# Patient Record
Sex: FEMALE | URBAN - METROPOLITAN AREA
[De-identification: names, ages, dates, MRNs, and addresses within clinical notes are randomized per-mention and may not be internally consistent; named-entity substitution may affect disease eponyms.]

---

## 2018-07-18 ENCOUNTER — APPOINTMENT (OUTPATIENT)
Age: 48
Setting detail: DERMATOLOGY
End: 2018-07-18

## 2018-07-18 DIAGNOSIS — D485 NEOPLASM OF UNCERTAIN BEHAVIOR OF SKIN: ICD-10-CM

## 2018-07-18 PROBLEM — D48.5 NEOPLASM OF UNCERTAIN BEHAVIOR OF SKIN: Status: ACTIVE | Noted: 2018-07-18

## 2018-07-18 PROCEDURE — 11100: CPT

## 2018-07-18 PROCEDURE — ? BIOPSY BY SHAVE METHOD

## 2018-07-18 PROCEDURE — ? COUNSELING

## 2018-07-18 ASSESSMENT — LOCATION SIMPLE DESCRIPTION DERM: LOCATION SIMPLE: CHEST

## 2018-07-18 ASSESSMENT — LOCATION ZONE DERM: LOCATION ZONE: TRUNK

## 2018-07-18 ASSESSMENT — LOCATION DETAILED DESCRIPTION DERM: LOCATION DETAILED: LEFT MEDIAL SUPERIOR CHEST

## 2018-07-18 NOTE — PROCEDURE: BIOPSY BY SHAVE METHOD
Wound Care: Vaseline
Anesthesia Volume In Cc: 0.1
Notification Instructions: Patient will be notified of biopsy results. However, patient instructed to call the office if not contacted within 2 weeks.
Bill 61849 For Specimen Handling/Conveyance To Laboratory?: no
Biopsy Method: Personna blade
Biopsy Type: H and E
Curettage Text: The wound bed was treated with curettage after the biopsy was performed.
Hemostasis: Pressure
Detail Level: Detailed
Size Of Lesion In Cm: 0
Electrodesiccation And Curettage Text: The wound bed was treated with electrodesiccation and curettage after the biopsy was performed.
Type Of Destruction Used: Curettage
Consent: Written consent was obtained and risks were reviewed including but not limited to scarring, infection, bleeding, scabbing, incomplete removal, nerve damage and allergy to anesthesia.
Was A Bandage Applied: Yes
Depth Of Biopsy: dermis
Electrodesiccation Text: The wound bed was treated with electrodesiccation after the biopsy was performed.
Billing Type: Third-Party Bill
Post-Care Instructions: I reviewed with the patient in detail post-care instructions. Patient is to keep the biopsy site dry overnight, and then apply bacitracin twice daily until healed. Patient may apply hydrogen peroxide soaks to remove any crusting.
Silver Nitrate Text: The wound bed was treated with silver nitrate after the biopsy was performed.
Dressing: bandage
Anesthesia Type: 1% lidocaine without epinephrine
Cryotherapy Text: The wound bed was treated with cryotherapy after the biopsy was performed.

## 2021-02-17 ENCOUNTER — NEW PATIENT (OUTPATIENT)
Age: 51
End: 2021-02-17

## 2021-02-17 DIAGNOSIS — H10.45: ICD-10-CM

## 2021-02-17 DIAGNOSIS — H04.123: ICD-10-CM

## 2021-02-17 PROCEDURE — 99203 OFFICE O/P NEW LOW 30 MIN: CPT

## 2021-02-17 ASSESSMENT — KERATOMETRY
OS_AXISANGLE2_DEGREES: 110
OD_AXISANGLE_DEGREES: 55
OS_K2POWER_DIOPTERS: 44.50
OD_K2POWER_DIOPTERS: 44.00
OD_AXISANGLE2_DEGREES: 145
OD_K1POWER_DIOPTERS: 43.75
OS_AXISANGLE_DEGREES: 20
OS_K1POWER_DIOPTERS: 44.00

## 2021-02-17 ASSESSMENT — VISUAL ACUITY
OD_CC: 20/25
OS_CC: 20/25

## (undated) RX ORDER — OLOPATADINE HYDROCHLORIDE 2 MG/ML: 1 SOLUTION OPHTHALMIC ONCE A DAY